# Patient Record
Sex: FEMALE | Race: BLACK OR AFRICAN AMERICAN | Employment: UNEMPLOYED | URBAN - METROPOLITAN AREA
[De-identification: names, ages, dates, MRNs, and addresses within clinical notes are randomized per-mention and may not be internally consistent; named-entity substitution may affect disease eponyms.]

---

## 2022-11-13 ENCOUNTER — HOSPITAL ENCOUNTER (EMERGENCY)
Age: 22
Discharge: HOME | End: 2022-11-13
Attending: EMERGENCY MEDICINE
Payer: MEDICAID

## 2022-11-13 ENCOUNTER — APPOINTMENT (OUTPATIENT)
Dept: CT IMAGING | Age: 22
End: 2022-11-13
Attending: EMERGENCY MEDICINE
Payer: MEDICAID

## 2022-11-13 VITALS
TEMPERATURE: 98.8 F | WEIGHT: 216 LBS | HEIGHT: 59 IN | HEART RATE: 93 BPM | SYSTOLIC BLOOD PRESSURE: 105 MMHG | BODY MASS INDEX: 43.55 KG/M2 | DIASTOLIC BLOOD PRESSURE: 65 MMHG | OXYGEN SATURATION: 100 % | RESPIRATION RATE: 20 BRPM

## 2022-11-13 DIAGNOSIS — E87.6 HYPOKALEMIA: ICD-10-CM

## 2022-11-13 DIAGNOSIS — R51.9 NONINTRACTABLE HEADACHE, UNSPECIFIED CHRONICITY PATTERN, UNSPECIFIED HEADACHE TYPE: Primary | ICD-10-CM

## 2022-11-13 DIAGNOSIS — R79.89 ELEVATED SERUM CREATININE: ICD-10-CM

## 2022-11-13 LAB
ANION GAP SERPL CALC-SCNC: 12 MMOL/L (ref 5–15)
BASOPHILS # BLD: 0 K/UL (ref 0–0.1)
BASOPHILS NFR BLD: 0 % (ref 0–1)
BUN SERPL-MCNC: 21 MG/DL (ref 6–20)
BUN/CREAT SERPL: 20 (ref 12–20)
CALCIUM SERPL-MCNC: 8.5 MG/DL (ref 8.5–10.1)
CHLORIDE SERPL-SCNC: 103 MMOL/L (ref 97–108)
CO2 SERPL-SCNC: 25 MMOL/L (ref 21–32)
CREAT SERPL-MCNC: 1.04 MG/DL (ref 0.55–1.02)
DIFFERENTIAL METHOD BLD: NORMAL
EOSINOPHIL # BLD: 0.1 K/UL (ref 0–0.4)
EOSINOPHIL NFR BLD: 1 % (ref 0–7)
ERYTHROCYTE [DISTWIDTH] IN BLOOD BY AUTOMATED COUNT: 13.5 % (ref 11.5–14.5)
FLUAV RNA SPEC QL NAA+PROBE: DETECTED
FLUBV RNA SPEC QL NAA+PROBE: NOT DETECTED
GLUCOSE SERPL-MCNC: 93 MG/DL (ref 65–100)
HCG SERPL QL: NEGATIVE
HCT VFR BLD AUTO: 40 % (ref 35–47)
HGB BLD-MCNC: 13.2 G/DL (ref 11.5–16)
IMM GRANULOCYTES # BLD AUTO: 0 K/UL (ref 0–0.04)
IMM GRANULOCYTES NFR BLD AUTO: 0 % (ref 0–0.5)
LYMPHOCYTES # BLD: 1.5 K/UL (ref 0.8–3.5)
LYMPHOCYTES NFR BLD: 19 % (ref 12–49)
MAGNESIUM SERPL-MCNC: 2 MG/DL (ref 1.6–2.4)
MCH RBC QN AUTO: 27.2 PG (ref 26–34)
MCHC RBC AUTO-ENTMCNC: 33 G/DL (ref 30–36.5)
MCV RBC AUTO: 82.3 FL (ref 80–99)
MONOCYTES # BLD: 0.9 K/UL (ref 0–1)
MONOCYTES NFR BLD: 11 % (ref 5–13)
NEUTS SEG # BLD: 5.2 K/UL (ref 1.8–8)
NEUTS SEG NFR BLD: 68 % (ref 32–75)
NRBC # BLD: 0 K/UL (ref 0–0.01)
NRBC BLD-RTO: 0 PER 100 WBC
PLATELET # BLD AUTO: 191 K/UL (ref 150–400)
PMV BLD AUTO: 11.2 FL (ref 8.9–12.9)
POTASSIUM SERPL-SCNC: 3.3 MMOL/L (ref 3.5–5.1)
RBC # BLD AUTO: 4.86 M/UL (ref 3.8–5.2)
SARS-COV-2, COV2: NOT DETECTED
SODIUM SERPL-SCNC: 140 MMOL/L (ref 136–145)
WBC # BLD AUTO: 7.7 K/UL (ref 3.6–11)

## 2022-11-13 PROCEDURE — 96374 THER/PROPH/DIAG INJ IV PUSH: CPT

## 2022-11-13 PROCEDURE — 96375 TX/PRO/DX INJ NEW DRUG ADDON: CPT

## 2022-11-13 PROCEDURE — 87636 SARSCOV2 & INF A&B AMP PRB: CPT

## 2022-11-13 PROCEDURE — 93005 ELECTROCARDIOGRAM TRACING: CPT

## 2022-11-13 PROCEDURE — 85025 COMPLETE CBC W/AUTO DIFF WBC: CPT

## 2022-11-13 PROCEDURE — 84703 CHORIONIC GONADOTROPIN ASSAY: CPT

## 2022-11-13 PROCEDURE — 70450 CT HEAD/BRAIN W/O DYE: CPT

## 2022-11-13 PROCEDURE — 80048 BASIC METABOLIC PNL TOTAL CA: CPT

## 2022-11-13 PROCEDURE — 74011250636 HC RX REV CODE- 250/636: Performed by: EMERGENCY MEDICINE

## 2022-11-13 PROCEDURE — 74011250637 HC RX REV CODE- 250/637: Performed by: EMERGENCY MEDICINE

## 2022-11-13 PROCEDURE — 99284 EMERGENCY DEPT VISIT MOD MDM: CPT

## 2022-11-13 PROCEDURE — 96361 HYDRATE IV INFUSION ADD-ON: CPT

## 2022-11-13 PROCEDURE — 83735 ASSAY OF MAGNESIUM: CPT

## 2022-11-13 RX ORDER — KETOROLAC TROMETHAMINE 30 MG/ML
30 INJECTION, SOLUTION INTRAMUSCULAR; INTRAVENOUS
Status: COMPLETED | OUTPATIENT
Start: 2022-11-13 | End: 2022-11-13

## 2022-11-13 RX ORDER — DIPHENHYDRAMINE HYDROCHLORIDE 50 MG/ML
50 INJECTION, SOLUTION INTRAMUSCULAR; INTRAVENOUS
Status: COMPLETED | OUTPATIENT
Start: 2022-11-13 | End: 2022-11-13

## 2022-11-13 RX ORDER — BUTALBITAL, ACETAMINOPHEN AND CAFFEINE 50; 325; 40 MG/1; MG/1; MG/1
1 TABLET ORAL
Qty: 20 TABLET | Refills: 0 | Status: SHIPPED | OUTPATIENT
Start: 2022-11-13

## 2022-11-13 RX ORDER — SODIUM CHLORIDE, SODIUM LACTATE, POTASSIUM CHLORIDE, CALCIUM CHLORIDE 600; 310; 30; 20 MG/100ML; MG/100ML; MG/100ML; MG/100ML
1000 INJECTION, SOLUTION INTRAVENOUS CONTINUOUS
Status: DISCONTINUED | OUTPATIENT
Start: 2022-11-13 | End: 2022-11-13 | Stop reason: HOSPADM

## 2022-11-13 RX ORDER — PROCHLORPERAZINE EDISYLATE 5 MG/ML
10 INJECTION INTRAMUSCULAR; INTRAVENOUS ONCE
Status: COMPLETED | OUTPATIENT
Start: 2022-11-13 | End: 2022-11-13

## 2022-11-13 RX ADMIN — POTASSIUM BICARBONATE 50 MEQ: 977.5 TABLET, EFFERVESCENT ORAL at 06:28

## 2022-11-13 RX ADMIN — KETOROLAC TROMETHAMINE 30 MG: 30 INJECTION, SOLUTION INTRAMUSCULAR; INTRAVENOUS at 04:02

## 2022-11-13 RX ADMIN — PROCHLORPERAZINE EDISYLATE 10 MG: 5 INJECTION INTRAMUSCULAR; INTRAVENOUS at 04:02

## 2022-11-13 RX ADMIN — DIPHENHYDRAMINE HYDROCHLORIDE 50 MG: 50 INJECTION, SOLUTION INTRAMUSCULAR; INTRAVENOUS at 04:02

## 2022-11-13 RX ADMIN — SODIUM CHLORIDE 1000 ML: 9 INJECTION, SOLUTION INTRAVENOUS at 04:02

## 2022-11-13 NOTE — DISCHARGE INSTRUCTIONS
[facility] SYSTEMS Departments     For adult and child immunizations, family planning, TB screening, STD testing and women's health services. [facility]: [place] [phone]      [facility] 25   [address]   [address]   170 [facility]: Walter Purvis [address] [facility] [address] [phone]      2400 [facility]          [address] [address]     For primary care services, woman and child wellness, and some clinics providing specialty care. [facility] -- [address] [facility]5 [facility] [phone]   411 [facility] 200 [facility] [address] [phone]   339 [facility] [address] [facility] [address] [phone]   [address] [facility] Page[employer] [address] [place] [address] 5850  Community  [phone]   [address] [place] [address] [phone]   [facility] 81 [facility] [phone]   [facility] 1051 [facility] [phone]   [facility]: [facility] cuauhtemoc Bynum [address] [place] [address], [address] [phone]     [facility] [address] [facility] [address] [phone]   [facility] 5850 [facility]  [phone]   [employer]  [address], [place] [address] ([email or web address]/[email or web address] asp) [phone]         Sexual Health/Woman Wellness Clinics    For STD/HIV testing and treatment, pregnancy testing and services, men's health, birth control services and hepatitis/HPV vaccine services. [facility] of [address] [facility] [phone]   [facility] [address] [phone]   Martha 17 600 [facility] [phone]   [address] [place] [address], 5th floor 3100 [facility] [address] [phone]   [facility] [employer] 201 [facility] [address] 7893 [facility] Blood [facility] 94 [facility]   [phone]   [place]   [phone]   Women, Infant and Children's Services: Cecily Dinero[address] [phone]   [address] [place] [address] Psychiatry     [phone]   [address] Crisis   [address] [phone]       Local [facility]  [facility]   [phone]    OSCAR Velázquez M.D. OSCAR Mcnally M.D.  MD Arabella Gaviria, ALICEP  Elizabeth Riddle, NAGA Riddle, RAYMOND Tejada, MD Jorge Kaiser, SONDRA [facility], [address]   [phone]     Pete Guerrero MD [address] [facility] [phone]  MD Nelson Britton MD [place] , MD Elizabeth Meyers MD        [phone]  Warden Vipin MD               2053 [facility] [phone]  MD Michael Medina MD Coit Sos, MD [facility] [phone]

## 2022-11-13 NOTE — ED PROVIDER NOTES
EMERGENCY DEPARTMENT HISTORY AND PHYSICAL EXAM      Date: 11/13/2022  Patient Name: Dhiraj Garibay    History of Presenting Illness     Chief Complaint   Patient presents with    Migraine    Vomiting       History Provided By: Patient    HPI: Dhiraj Garibay, 23 y.o. female with no pertinent past medical history presents emergency department chief complaint of headache, nausea vomiting. Patient reports intermittent bitemporal headache over the last week. Pain was worse yesterday evening with some associated nausea vomiting prompting him to come to the emergency department today. Patient reports history of similar headaches in the past.  Headache is gradual in onset. No visual changes, neck stiffness/pain or focal neurologic deficits. Denies photophobia. Pt denies any other alleviating or exacerbating factors. Additionally, pt specifically denies any recent fever, chills,  , abdominal pain, CP, SOB, lightheadedness, dizziness, numbness, weakness, BLE swelling, heart palpitations, urinary sxs, diarrhea, constipation, melena, hematochezia, cough, or congestion. PCP: None    Current Outpatient Medications   Medication Sig Dispense Refill    butalbital-acetaminophen-caffeine (FIORICET, ESGIC) -40 mg per tablet Take 1 Tablet by mouth every six (6) hours as needed for Headache. Indications: a migraine headache 20 Tablet 0       Past History     Past Medical History:  History reviewed, no pertinent past medical history    Past Surgical History:  No past surgical history on file. Family History:  No family history on file. Social History:  Denies current heavy alcohol or illicit drug use. Allergies:  No Known Allergies      Review of Systems   Review of Systems  Constitutional: Negative for fever, chills, and fatigue. HENT: Negative for congestion, sore throat, rhinorrhea, sneezing and neck stiffness   Eyes: Negative for discharge and redness.    Respiratory: Negative for  shortness of breath, wheezing Cardiovascular: Negative for chest pain, palpitations   Gastrointestinal: Positive for nausea, vomiting. Negative abdominal pain, constipation, diarrhea and blood in stool. Genitourinary: Negative for dysuria, hematuria, flank pain, decreased urine volume, discharge,   Musculoskeletal: Negative for myalgias or joint pain . Skin: Negative for rash or lesions . Neurological: Negative weakness, light-headedness, numbness. Positive headaches. Physical Exam   Physical Exam    GENERAL: alert and oriented, no acute distress  EYES: PEERL, No injection, discharge or icterus. ENT: Mucous membranes pink and moist.  NECK: Supple  LUNGS: Airway patent. Non-labored respirations. Breath sounds clear with good air entry bilaterally. HEART: Regular rate and rhythm. No peripheral edema  ABDOMEN: Non-distended and non-tender, without guarding or rebound. SKIN:  warm, dry  MSK/EXTREMITIES: Without swelling, tenderness or deformity, symmetric with normal ROM  NEUROLOGICAL:  alert and oriented x 3, CN II-XII grossly intact, strength 5/5 bilateral upper and lower extremities, sensation intact throughout to light touch, no dysmetria or ataxia noted        Diagnostic Study Results     Labs -   No results found for this or any previous visit (from the past 12 hour(s)). Radiologic Studies -   CT HEAD WO CONT   Final Result      No acute process. CT Results  (Last 48 hours)                 11/13/22 0612  CT HEAD WO CONT Final result    Impression:      No acute process. Narrative:  INDICATION: headache       EXAM:  HEAD CT WITHOUT CONTRAST       COMPARISON: None       TECHNIQUE:  Routine noncontrast axial head CT was performed. Sagittal and   coronal reconstructions were generated. CT dose reduction was achieved through use of a standardized protocol tailored   for this examination and automatic exposure control for dose modulation. FINDINGS:       Ventricles: Midline, no hydrocephalus. Intracranial Hemorrhage: None. Brain Parenchyma/Brainstem: Normal for age. Basal Cisterns: Normal.   Paranasal Sinuses: Visualized sinuses are clear. Additional Comments: N/A. CXR Results  (Last 48 hours)      None              Medical Decision Making     Emani HERNANDEZ MD am the first provider for this patient and am the attending of record for this patient encounter. I reviewed the vital signs, available nursing notes, past medical history, past surgical history, family history and social history. Vital Signs-Reviewed the patient's vital signs. No data found. Records Reviewed: Nursing Notes and Old Medical Records    Provider Notes (Medical Decision Making): On presentation, the patient is well appearing, in no acute distress with normal vital signs. Based on my history and exam the differential diagnosis for this patient includes migraine headache, subarachnoid hemorrhage, increased intercranial pressure, dural venous thrombosis, meningitis. Patient not experiencing any infectious symptoms to suggest meningitis. She has no visual changes or other symptoms that would suggest increased intercranial pressure, no focal neurodeficits. Patient does report a history of similar headaches however has not had neuroimaging done so obtained a CT which showed no acute findings. Otherwise her description would not be suggestive of subarachnoid and she would be negative per [place] subarachnoid rule so do not feel that CTA or lumbar puncture would be indicated. She was given migraine cocktail with complete resolution in symptoms and felt stable for discharge. Patient's labs are notable for slightly elevated serum creatinine, will need follow-up as an outpatient for further assessment. ED Course:   Initial assessment performed. The patients presenting problems have been discussed, and they are in agreement with the care plan formulated and outlined with them.   I have encouraged them to ask questions as they arise throughout their visit. Medications   sodium chloride 0.9 % bolus infusion 1,000 mL (0 mL IntraVENous IV Completed 11/13/22 0502)   prochlorperazine (COMPAZINE) injection 10 mg (10 mg IntraVENous Given 11/13/22 0402)   diphenhydrAMINE (BENADRYL) injection 50 mg (50 mg IntraVENous Given 11/13/22 0402)   ketorolac (TORADOL) injection 30 mg (30 mg IntraVENous Given 11/13/22 0402)   potassium bicarb-citric acid (EFFER-K) tablet 50 mEq (50 mEq Oral Given 11/13/22 0628)         PROGRESS  Johanne Kohler's  results have been reviewed with her. She has been counseled regarding her diagnosis. She verbally conveys understanding and agreement of the signs, symptoms, diagnosis, treatment and prognosis and additionally agrees to follow up as recommended. She also agrees with the care-plan and conveys that all of her questions have been answered. I have also put together some discharge instructions for her that include: 1) educational information regarding their diagnosis, 2) how to care for their diagnosis at home, as well a 3) list of reasons why they would want to return to the ED prior to their follow-up appointment, should their condition change. Disposition:  home    PLAN:  1. Discharge Medication List as of 11/13/2022  6:50 AM        START taking these medications    Details   butalbital-acetaminophen-caffeine (FIORICET, ESGIC) -40 mg per tablet Take 1 Tablet by mouth every six (6) hours as needed for Headache. Indications: a migraine headache, Normal, Disp-20 Tablet, R-0           2.    Follow-up Information       Follow up With Specialties Details Why [address] EMERGENCY DEPT Emergency Medicine  If symptoms worsen Stevie 27    1200 [facility] Internal Medicine Schedule an appointment as soon as possible for a visit   Ra Nicholson  [address] [place] [address]  [phone] Return to ED if worse     Diagnosis     Clinical Impression:   1. Nonintractable headache, unspecified chronicity pattern, unspecified headache type    2. Hypokalemia    3. Elevated serum creatinine        Please note that this dictation was completed with Dragon, computer voice recognition software. Quite often unanticipated grammatical, syntax, homophones, and other interpretive errors are inadvertently transcribed by the computer software. Please disregard these errors. Additionally, please excuse any errors that have escaped final proofreading.

## 2022-11-13 NOTE — ED NOTES
Emergency Department Nursing Plan of Care       The Nursing Plan of Care is developed from the Nursing assessment and Emergency Department Attending provider initial evaluation. The plan of care may be reviewed in the ED Provider note.     The Plan of Care was developed with the following considerations:   Patient / Family readiness to learn indicated by:verbalized understanding  Persons(s) to be included in education: patient  Barriers to Learning/Limitations:No    Signed     Suzanne Grossman RN    11/13/2022   3:28 AM

## 2022-11-14 LAB
ATRIAL RATE: 108 BPM
CALCULATED P AXIS, ECG09: 43 DEGREES
CALCULATED R AXIS, ECG10: 26 DEGREES
CALCULATED T AXIS, ECG11: 21 DEGREES
DIAGNOSIS, 93000: NORMAL
P-R INTERVAL, ECG05: 136 MS
Q-T INTERVAL, ECG07: 342 MS
QRS DURATION, ECG06: 74 MS
QTC CALCULATION (BEZET), ECG08: 458 MS
VENTRICULAR RATE, ECG03: 108 BPM

## 2024-02-17 ENCOUNTER — HOSPITAL ENCOUNTER (EMERGENCY)
Facility: HOSPITAL | Age: 24
Discharge: HOME | End: 2024-02-17
Payer: COMMERCIAL

## 2024-02-17 VITALS
BODY MASS INDEX: 44.76 KG/M2 | TEMPERATURE: 98.6 F | SYSTOLIC BLOOD PRESSURE: 125 MMHG | WEIGHT: 222 LBS | HEART RATE: 95 BPM | OXYGEN SATURATION: 96 % | RESPIRATION RATE: 20 BRPM | HEIGHT: 59 IN | DIASTOLIC BLOOD PRESSURE: 84 MMHG

## 2024-02-17 DIAGNOSIS — R11.10 POST-TUSSIVE EMESIS: ICD-10-CM

## 2024-02-17 DIAGNOSIS — J06.9 ACUTE UPPER RESPIRATORY INFECTION: Primary | ICD-10-CM

## 2024-02-17 LAB
FLUAV AG NPH QL IA: NEGATIVE
FLUBV AG NOSE QL IA: NEGATIVE

## 2024-02-17 PROCEDURE — 87635 SARS-COV-2 COVID-19 AMP PRB: CPT

## 2024-02-17 PROCEDURE — 99283 EMERGENCY DEPT VISIT LOW MDM: CPT

## 2024-02-17 PROCEDURE — 87804 INFLUENZA ASSAY W/OPTIC: CPT

## 2024-02-17 PROCEDURE — 6370000000 HC RX 637 (ALT 250 FOR IP): Performed by: PHYSICIAN ASSISTANT

## 2024-02-17 RX ORDER — BENZONATATE 100 MG/1
100 CAPSULE ORAL
Status: COMPLETED | OUTPATIENT
Start: 2024-02-17 | End: 2024-02-17

## 2024-02-17 RX ORDER — BENZONATATE 200 MG/1
200 CAPSULE ORAL 3 TIMES DAILY PRN
Qty: 21 CAPSULE | Refills: 0 | Status: SHIPPED | OUTPATIENT
Start: 2024-02-17 | End: 2024-02-24

## 2024-02-17 RX ADMIN — BENZONATATE 100 MG: 100 CAPSULE ORAL at 09:49

## 2024-02-17 ASSESSMENT — PAIN - FUNCTIONAL ASSESSMENT: PAIN_FUNCTIONAL_ASSESSMENT: NONE - DENIES PAIN

## 2024-02-17 NOTE — ED PROVIDER NOTES
[facility] EMERGENCY DEPT  EMERGENCY DEPARTMENT ENCOUNTER         Pt Name: Eva Liz  MRN: [id number]  Birthdate 2000  Date of evaluation: 2/17/2024  Provider: Evy Ga PA-C   PCP: No primary care provider on file.  Note Started: 9:45 AM EST on 2/17/24     CHIEF COMPLAINT       Chief Complaint   Patient presents with    Cough        HISTORY OF PRESENT ILLNESS: 1 or more elements      History From: Patient  None     Eva Liz is a 23 y.o. female with medical history significant for asthma who presents via self with complaints of  acute moderate persistent cough X 1 day.  States that she has had mild congestion as well as fevers for the last 3 days.  Over-the-counter cough medicine with minimal relief.  Last dose this morning.  No chest pain, shortness of breath, wheezing, hemoptysis, lightheadedness, dizziness, syncope, seizure, palpitations, orthopnea, PND, dyspnea on exertion, abdominal pain.  She does endorse posttussive emesis today, but denies any nausea.  No hematemesis.      Nursing Notes were all reviewed and agreed with or any disagreements were addressed in the HPI.     REVIEW OF SYSTEMS      Review of Systems   Constitutional:  Positive for chills and fever. Negative for activity change, appetite change, diaphoresis, fatigue and unexpected weight change.   HENT:  Positive for congestion. Negative for dental problem, drooling, ear discharge, ear pain, facial swelling, hearing loss, mouth sores, nosebleeds, postnasal drip, rhinorrhea, sinus pressure, sinus pain, sneezing, sore throat, tinnitus, trouble swallowing and voice change.    Eyes:  Negative for pain and visual disturbance.   Respiratory:  Positive for cough. Negative for chest tightness, shortness of breath, wheezing and stridor.    Cardiovascular:  Negative for chest pain, palpitations and leg swelling.   Gastrointestinal:  Positive for vomiting. Negative for abdominal pain, blood in stool, constipation, diarrhea and nausea.   Musculoskeletal:   history of asthma presents with acute moderate persistent cough X 1 day.  States that she has had mild congestion as well as fevers for the last 3 days.  Over-the-counter cough medicine with minimal relief.  Last dose this morning.  No chest pain, shortness of breath, wheezing, hemoptysis, lightheadedness, dizziness, syncope, seizure, palpitations, orthopnea, PND, dyspnea on exertion, abdominal pain.  She does endorse posttussive emesis today, but denies any nausea.  No hematemesis.  Patient is sitting upright and speaking in clear complete sentences with no cough noted on exam.  Normal heart lung sounds with no adventitious lung sounds.  Normal oropharynx.  Suspect viral syndrome.  Differential includes COVID, flu, other viral URI, bronchitis.  No adventitious lung sounds or other systemic symptoms concerning for pneumonia.  Will obtain COVID and flu screen and treat with antitussives.         Symptomatic therapy suggested: acetaminophen, ibuprofen, cough suppressant of choice. Increase fluids, use vaporizer, stay in steamy bathroom tid 15 min prn severe cough, tylenol as needed, rest, avoid smoky areas. Lack of antibiotic effectiveness discussed with her. Symptomatic therapy suggested: gargle for sore throat, use mist at bedside for congestion.  Apply facial warm packs for sinus pain or use nasal saline sprays. Follow up prn if not better in 72 hours.      Disposition Considerations (Tests not done, Shared Decision Making, Pt Expectation of Test or Tx.):        Progress Note:   Updated pt on all returned results and findings. Discussed the importance of proper follow up as referred below along with return precautions. Pt in agreement with the care plan and expresses agreement with and understanding of all items discussed.    FINAL IMPRESSION     1. Acute upper respiratory infection    2. Post-tussive emesis          DISPOSITION/PLAN   Eva Liz's  results have been reviewed with her.  She has been counseled

## 2024-02-18 LAB
SARS-COV-2 RNA RESP QL NAA+PROBE: NOT DETECTED
SOURCE: NORMAL

## 2024-11-26 SDOH — HEALTH STABILITY: PHYSICAL HEALTH: ON AVERAGE, HOW MANY DAYS PER WEEK DO YOU ENGAGE IN MODERATE TO STRENUOUS EXERCISE (LIKE A BRISK WALK)?: 2 DAYS

## 2024-11-26 SDOH — HEALTH STABILITY: PHYSICAL HEALTH: ON AVERAGE, HOW MANY MINUTES DO YOU ENGAGE IN EXERCISE AT THIS LEVEL?: 30 MIN

## 2024-11-27 ENCOUNTER — OFFICE VISIT (OUTPATIENT)
Facility: CLINIC | Age: 24
End: 2024-11-27
Payer: COMMERCIAL

## 2024-11-27 VITALS
RESPIRATION RATE: 16 BRPM | WEIGHT: 238 LBS | DIASTOLIC BLOOD PRESSURE: 64 MMHG | HEIGHT: 59 IN | SYSTOLIC BLOOD PRESSURE: 94 MMHG | BODY MASS INDEX: 47.98 KG/M2 | HEART RATE: 86 BPM | OXYGEN SATURATION: 97 %

## 2024-11-27 DIAGNOSIS — Z76.89 ENCOUNTER TO ESTABLISH CARE: Primary | ICD-10-CM

## 2024-11-27 DIAGNOSIS — Z11.59 NEED FOR HEPATITIS C SCREENING TEST: ICD-10-CM

## 2024-11-27 DIAGNOSIS — E66.01 OBESITY, MORBID (MORE THAN 100 LBS OVER IDEAL WEIGHT OR BMI > 40): ICD-10-CM

## 2024-11-27 DIAGNOSIS — Z11.8 SCREENING FOR CHLAMYDIAL DISEASE: ICD-10-CM

## 2024-11-27 DIAGNOSIS — E55.9 VITAMIN D DEFICIENCY: ICD-10-CM

## 2024-11-27 DIAGNOSIS — Z11.4 ENCOUNTER FOR SCREENING FOR HIV: ICD-10-CM

## 2024-11-27 DIAGNOSIS — R73.03 PREDIABETES: ICD-10-CM

## 2024-11-27 PROBLEM — Z13.21 ENCOUNTER FOR VITAMIN DEFICIENCY SCREENING: Status: ACTIVE | Noted: 2024-11-27

## 2024-11-27 LAB
APPEARANCE UR: CLEAR
BACTERIA URNS QL MICRO: NEGATIVE /HPF
BILIRUB UR QL: NEGATIVE
COLOR UR: ABNORMAL
EPITH CASTS URNS QL MICRO: ABNORMAL /LPF
GLUCOSE UR STRIP.AUTO-MCNC: NEGATIVE MG/DL
HGB UR QL STRIP: ABNORMAL
HYALINE CASTS URNS QL MICRO: ABNORMAL /LPF (ref 0–5)
KETONES UR QL STRIP.AUTO: NEGATIVE MG/DL
LEUKOCYTE ESTERASE UR QL STRIP.AUTO: NEGATIVE
NITRITE UR QL STRIP.AUTO: NEGATIVE
PH UR STRIP: 6 (ref 5–8)
PROT UR STRIP-MCNC: NEGATIVE MG/DL
RBC #/AREA URNS HPF: ABNORMAL /HPF (ref 0–5)
SP GR UR REFRACTOMETRY: 1.02 (ref 1–1.03)
UROBILINOGEN UR QL STRIP.AUTO: 0.2 EU/DL (ref 0.2–1)
WBC URNS QL MICRO: ABNORMAL /HPF (ref 0–4)

## 2024-11-27 PROCEDURE — 99204 OFFICE O/P NEW MOD 45 MIN: CPT

## 2024-11-27 SDOH — ECONOMIC STABILITY: FOOD INSECURITY: WITHIN THE PAST 12 MONTHS, YOU WORRIED THAT YOUR FOOD WOULD RUN OUT BEFORE YOU GOT MONEY TO BUY MORE.: NEVER TRUE

## 2024-11-27 SDOH — ECONOMIC STABILITY: FOOD INSECURITY: WITHIN THE PAST 12 MONTHS, THE FOOD YOU BOUGHT JUST DIDN'T LAST AND YOU DIDN'T HAVE MONEY TO GET MORE.: NEVER TRUE

## 2024-11-27 SDOH — ECONOMIC STABILITY: INCOME INSECURITY: HOW HARD IS IT FOR YOU TO PAY FOR THE VERY BASICS LIKE FOOD, HOUSING, MEDICAL CARE, AND HEATING?: NOT HARD AT ALL

## 2024-11-27 ASSESSMENT — PATIENT HEALTH QUESTIONNAIRE - PHQ9
SUM OF ALL RESPONSES TO PHQ QUESTIONS 1-9: 0
2. FEELING DOWN, DEPRESSED OR HOPELESS: NOT AT ALL
SUM OF ALL RESPONSES TO PHQ QUESTIONS 1-9: 0
1. LITTLE INTEREST OR PLEASURE IN DOING THINGS: NOT AT ALL
SUM OF ALL RESPONSES TO PHQ9 QUESTIONS 1 & 2: 0
SUM OF ALL RESPONSES TO PHQ QUESTIONS 1-9: 0
SUM OF ALL RESPONSES TO PHQ QUESTIONS 1-9: 0

## 2024-11-27 NOTE — PATIENT INSTRUCTIONS
WEIGHT LOSS PLAN    1.  Read food labels and count calories.  Goal 9145-3266 calories daily for women and 3756-7971 calories daily for men as you are weaning off sugar and preparing to start a Low Calorie Dietary Approach.  Achieve this by decreasing caloric intake by 500 calories by weekly increments.  NOTE:  1 pound of fat = 3500 kilocalories!    Www.loseit.Gojimo  Www.Codotapal.Gojimo  Www.Wimdu  Www.Goodoc.gov  Weight watchers mobile    Calories needed to lose 1-2 pounds a week = 10 x your weight in pounds    2.  Increase water intake.    Per Biggest Loser Weight loss Program, it is important to drink 1/2 your body weight in ounces of water daily.  Decrease your water consumption 2-3 hours before bedtime to prevent sleep disturbance from frequent urination.    3.  Decrease sugary beverages.  Each can or glass of soda increases your risk of obesity by 60%.  You can lose 10 pounds in a month by avoiding any soda.     12 oz can of soda = 140 calories, 16 oz cup of sweet tea = 200 calories    16 oz orange juice = 200 calories, 10 oz apple juice = 150 calories   32 oz sports drink = 200 calories, 16 oz punch = 240 calories   3.5 oz alcohol = 100-150 calories     4.  Avoid High Fructose Corn Syrup products.  This ingredient makes products highly addictive!    5.  Exercise 30 minutes daily 5 days weekly, minimally.  If you burn 3500 calories that equals a pound!  Try using a pedometer  or a smart watch to your count steps. Visit www.Heverest.ru.Gojimo for a free pedometer and diet recommendations.      Your maximum heart rate = 220 - your age    Never exercise at your maximum heart rate.    See handout for target heart rate.    6.  Decrease carbohydrates (white bread, pasta, rice, potatoes, sweet foods and sweet drinks like soda, tea, coffee, juice and sports drinks).  Increase fiber and protein.    Goal:   calories daily of carbohydrates     Try CHROMIUM PICONATE 200 MG THREE TIMES DAILY,    to

## 2024-11-28 LAB
25(OH)D3 SERPL-MCNC: 23.8 NG/ML (ref 30–100)
ALBUMIN SERPL-MCNC: 3.9 G/DL (ref 3.5–5)
ALBUMIN/GLOB SERPL: 1.1 (ref 1.1–2.2)
ALP SERPL-CCNC: 76 U/L (ref 45–117)
ALT SERPL-CCNC: 28 U/L (ref 12–78)
ANION GAP SERPL CALC-SCNC: 7 MMOL/L (ref 2–12)
AST SERPL-CCNC: 42 U/L (ref 15–37)
BILIRUB SERPL-MCNC: 0.4 MG/DL (ref 0.2–1)
BUN SERPL-MCNC: 18 MG/DL (ref 6–20)
BUN/CREAT SERPL: 26 (ref 12–20)
CALCIUM SERPL-MCNC: 9.4 MG/DL (ref 8.5–10.1)
CHLORIDE SERPL-SCNC: 108 MMOL/L (ref 97–108)
CHOLEST SERPL-MCNC: 154 MG/DL
CO2 SERPL-SCNC: 25 MMOL/L (ref 21–32)
CREAT SERPL-MCNC: 0.68 MG/DL (ref 0.55–1.02)
ERYTHROCYTE [DISTWIDTH] IN BLOOD BY AUTOMATED COUNT: 14.2 % (ref 11.5–14.5)
EST. AVERAGE GLUCOSE BLD GHB EST-MCNC: 126 MG/DL
GLOBULIN SER CALC-MCNC: 3.5 G/DL (ref 2–4)
GLUCOSE SERPL-MCNC: 76 MG/DL (ref 65–100)
HBA1C MFR BLD: 6 % (ref 4–5.6)
HCT VFR BLD AUTO: 39.9 % (ref 35–47)
HCV AB SER IA-ACNC: 0.15 INDEX
HCV AB SERPL QL IA: NONREACTIVE
HDLC SERPL-MCNC: 48 MG/DL
HDLC SERPL: 3.2 (ref 0–5)
HGB BLD-MCNC: 12.9 G/DL (ref 11.5–16)
HIV 1+2 AB+HIV1 P24 AG SERPL QL IA: NONREACTIVE
HIV 1/2 RESULT COMMENT: NORMAL
LDLC SERPL CALC-MCNC: 95.2 MG/DL (ref 0–100)
MCH RBC QN AUTO: 26.7 PG (ref 26–34)
MCHC RBC AUTO-ENTMCNC: 32.3 G/DL (ref 30–36.5)
MCV RBC AUTO: 82.4 FL (ref 80–99)
NRBC # BLD: 0 K/UL (ref 0–0.01)
NRBC BLD-RTO: 0 PER 100 WBC
PLATELET # BLD AUTO: 261 K/UL (ref 150–400)
PMV BLD AUTO: 10.7 FL (ref 8.9–12.9)
POTASSIUM SERPL-SCNC: 4.5 MMOL/L (ref 3.5–5.1)
PROT SERPL-MCNC: 7.4 G/DL (ref 6.4–8.2)
RBC # BLD AUTO: 4.84 M/UL (ref 3.8–5.2)
SODIUM SERPL-SCNC: 140 MMOL/L (ref 136–145)
TRIGL SERPL-MCNC: 54 MG/DL
VLDLC SERPL CALC-MCNC: 10.8 MG/DL
WBC # BLD AUTO: 7.1 K/UL (ref 3.6–11)

## 2024-12-02 LAB
C TRACH RRNA SPEC QL NAA+PROBE: NEGATIVE
N GONORRHOEA RRNA SPEC QL NAA+PROBE: NEGATIVE
SPECIMEN SOURCE: NORMAL
T VAGINALIS RRNA SPEC QL NAA+PROBE: NEGATIVE
TSH SERPL DL<=0.05 MIU/L-ACNC: 2.24 UIU/ML (ref 0.45–4.5)

## 2024-12-02 RX ORDER — ERGOCALCIFEROL 1.25 MG/1
50000 CAPSULE, LIQUID FILLED ORAL WEEKLY
Qty: 12 CAPSULE | Refills: 0 | Status: SHIPPED | OUTPATIENT
Start: 2024-12-02

## 2024-12-02 NOTE — PROGRESS NOTES
Chief Complaint   Patient presents with    Establish Care     1. Have you been to the ER, urgent care clinic since your last visit?  Hospitalized since your last visit?No    2. Have you seen or consulted any other health care providers outside of the [facility] since your last visit?  Include any pap smears or colon screening. No    
next office visit.  Use how you feel and  how your clothes fit as measurements of success.    12.  Address your spirituality to draw strength from above during your journey.  Remember \"I am fearfully and wonderfully made.  Marvelous in His eyes.\"    13.  Set realistic, appropriate and achievable weight loss goals:     RECOMMENDED TARGET WEIGHT LOSS:  Initial weight loss of 5-10% of your initial body weight achieved over 6 months or a decrease of 2 BMI units.     MINIMUM GOAL OF WEIGHT LOSS:  Reduce body weight and maintain a lower body weight.  Prevent weight gain.    RELATED WEBSITES:      Www.obesityaction.org  Consider joining the Obesity Action Coalition (OAC) for $25/year.  The OAC mission is to elevated and empower those affected by obesity through education, advocacy and support.  Quarterly journals included in membership fee.    Www.Zoomph  www.Skytap     RELATED DIETS:  Dr. Karthik Bass [place] Weight loss challenge, Mediterranean diet, [place] Beach Diet, Weight Watchers, Paleo Diet (anti-inflammatory diet)      Follow-up and Dispositions    Return in about 2 weeks (around 12/11/2024) for OV:Weight Mgmt, OV:LAB REV, OR SOONER IF NEEDED.       I have reviewed with the patient details of the assessment and plan and all questions were answered. Relevent patient education was performed.The most recent lab findings were reviewed with the patient.    An After Visit Summary was printed and given to the patient.      Signed By: HAVEN Sawyer NP     November 27, 2024         -----------------------------------------------------------------------------------------------------------------------------------------      The patient (or guardian, if applicable) and other individuals in attendance with the patient were advised that Artificial Intelligence will be utilized during this visit to record and process the conversation to generate a clinical note. The patient (or guardian, if

## 2024-12-20 ENCOUNTER — OFFICE VISIT (OUTPATIENT)
Facility: CLINIC | Age: 24
End: 2024-12-20
Payer: COMMERCIAL

## 2024-12-20 VITALS
WEIGHT: 238 LBS | BODY MASS INDEX: 47.98 KG/M2 | RESPIRATION RATE: 16 BRPM | OXYGEN SATURATION: 97 % | HEART RATE: 75 BPM | HEIGHT: 59 IN | DIASTOLIC BLOOD PRESSURE: 71 MMHG | SYSTOLIC BLOOD PRESSURE: 107 MMHG

## 2024-12-20 DIAGNOSIS — E66.01 OBESITY, MORBID (MORE THAN 100 LBS OVER IDEAL WEIGHT OR BMI > 40): Primary | ICD-10-CM

## 2024-12-20 DIAGNOSIS — R73.03 PREDIABETES: ICD-10-CM

## 2024-12-20 DIAGNOSIS — E55.9 VITAMIN D DEFICIENCY: ICD-10-CM

## 2024-12-20 DIAGNOSIS — E66.01 OBESITY, CLASS III, BMI 40-49.9 (MORBID OBESITY): ICD-10-CM

## 2024-12-20 PROCEDURE — 99214 OFFICE O/P EST MOD 30 MIN: CPT

## 2024-12-20 RX ORDER — TIRZEPATIDE 5 MG/.5ML
5 INJECTION, SOLUTION SUBCUTANEOUS WEEKLY
Qty: 2 ML | Refills: 0 | Status: SHIPPED | OUTPATIENT
Start: 2024-12-20 | End: 2025-01-09

## 2024-12-20 RX ORDER — TIRZEPATIDE 2.5 MG/.5ML
2.5 INJECTION, SOLUTION SUBCUTANEOUS
Qty: 2 ML | Refills: 0 | Status: SHIPPED | OUTPATIENT
Start: 2024-12-20 | End: 2025-01-09

## 2024-12-20 ASSESSMENT — PATIENT HEALTH QUESTIONNAIRE - PHQ9
1. LITTLE INTEREST OR PLEASURE IN DOING THINGS: NOT AT ALL
SUM OF ALL RESPONSES TO PHQ QUESTIONS 1-9: 0
SUM OF ALL RESPONSES TO PHQ9 QUESTIONS 1 & 2: 0
2. FEELING DOWN, DEPRESSED OR HOPELESS: NOT AT ALL

## 2024-12-20 NOTE — PROGRESS NOTES
Eva Liz is a 24 y.o. female , established patient, here for evaluation of the following chief complaint(s): Discuss Labs and Weight Management     Subjective:  History of Present Illness  The patient is here for a follow-up visit and to discuss lab results.    She has been diagnosed with prediabetes. She has not yet started a gym regimen but has incorporated walking into her routine. She has contacted her insurance provider regarding coverage for weight loss medications and was informed that Zepbound is covered. She is currently making efforts to reduce her dietary intake. She reports no chest pain, shortness of breath, diarrhea, constipation, or discharges.    She is also has vitamin D deficiency.    FAMILY HISTORY  She does not have any family history of thyroid medullary cancer or thyroid tumors.    Wt Readings from Last 3 Encounters:   12/20/24 108 kg (238 lb)   11/27/24 108 kg (238 lb)   02/17/24 100.7 kg (222 lb)     Reviewed PmHx, RxHx, FmHx, SocHx, AllgHx and updated in chart.     Review of Systems  Constitutional: negative for fevers, chills, anorexia and weight loss  Eyes:   negative for visual disturbance and irritation  ENT:   negative for tinnitus,sore throat,nasal congestion,ear pains.hoarseness  Respiratory:  negative for cough, hemoptysis, dyspnea,wheezing  CV:   negative for chest pain, palpitations, lower extremity edema  GI:   negative for nausea, vomiting, diarrhea, abdominal pain,melena  Endo:               negative for polyuria,polydipsia,polyphagia,heat intolerance  Genitourinary: negative for frequency, dysuria and hematuria  Integument:  negative for rash and pruritus  Hematologic:  negative for easy bruising and gum/nose bleeding  Musculoskel: negative for myalgias, arthralgias, back pain, muscle weakness, joint pain  Neurological:  negative for headaches, dizziness, vertigo, memory problems and gait   Behavl/Psych: negative for feelings of anxiety, depression, mood changes    Past

## 2024-12-20 NOTE — PATIENT INSTRUCTIONS
WEIGHT LOSS PLAN    1.  Read food labels and count calories.  Goal 6917-5683 calories daily for women and 1711-0420 calories daily for men as you are weaning off sugar and preparing to start a Low Calorie Dietary Approach.  Achieve this by decreasing caloric intake by 500 calories by weekly increments.  NOTE:  1 pound of fat = 3500 kilocalories!    [email or web address]  [email or web address]  [email or web address]  [email or web address].[employer]  Weight watchers mobile    Calories needed to lose 1-2 pounds a week = 10 x your weight in pounds    2.  Increase water intake.    Per [employer] [facility], it is important to drink 1/2 your body weight in ounces of water daily.  Decrease your water consumption 2-3 hours before bedtime to prevent sleep disturbance from frequent urination.    3.  Decrease sugary beverages.  Each can or glass of soda increases your risk of obesity by 60%.  You can lose 10 pounds in a month by avoiding any soda.     12 oz can of soda = 140 calories, 16 oz cup of sweet tea = 200 calories    16 oz orange juice = 200 calories, 10 oz apple juice = 150 calories   32 oz sports drink = 200 calories, 16 oz punch = 240 calories   3.5 oz alcohol = 100-150 calories     4.  Avoid High Fructose Corn Syrup products.  This ingredient makes products highly addictive!    5.  Exercise 30 minutes daily 5 days weekly, minimally.  If you burn 3500 calories that equals a pound!  Try using a pedometer  or a smart watch to your count steps. Visit www.[employer] for a free pedometer and diet recommendations.      Your maximum heart rate = 220 - your age    Never exercise at your maximum heart rate.    See handout for target heart rate.    6.  Decrease carbohydrates (white bread, pasta, rice, potatoes, sweet foods and sweet drinks like soda, tea, coffee, juice and sports drinks).  Increase fiber and protein.    Goal:   calories daily of carbohydrates     Try CHROMIUM PICONATE 200 MG THREE TIMES DAILY,    to

## 2024-12-20 NOTE — PROGRESS NOTES
Chief Complaint   Patient presents with    Discuss Labs    Weight Management     1. Have you been to the ER, urgent care clinic since your last visit?  Hospitalized since your last visit?No    2. Have you seen or consulted any other health care providers outside [facility] since your last visit?  Include any pap smears or colon screening. No

## 2024-12-27 PROBLEM — Z13.21 ENCOUNTER FOR VITAMIN DEFICIENCY SCREENING: Status: RESOLVED | Noted: 2024-11-27 | Resolved: 2024-12-27

## 2025-01-09 DIAGNOSIS — E66.01 OBESITY, CLASS III, BMI 40-49.9 (MORBID OBESITY): ICD-10-CM

## 2025-01-09 DIAGNOSIS — R73.03 PREDIABETES: ICD-10-CM

## 2025-01-09 DIAGNOSIS — E66.01 OBESITY, MORBID (MORE THAN 100 LBS OVER IDEAL WEIGHT OR BMI > 40): Primary | ICD-10-CM

## 2025-01-09 RX ORDER — SEMAGLUTIDE 0.5 MG/.5ML
0.5 INJECTION, SOLUTION SUBCUTANEOUS
Qty: 2 ML | Refills: 0 | Status: SHIPPED | OUTPATIENT
Start: 2025-02-08

## 2025-01-09 RX ORDER — SEMAGLUTIDE 0.25 MG/.5ML
0.25 INJECTION, SOLUTION SUBCUTANEOUS
Qty: 2 ML | Refills: 0 | Status: SHIPPED | OUTPATIENT
Start: 2025-01-09 | End: 2025-02-08

## 2025-01-09 RX ORDER — ERGOCALCIFEROL 1.25 MG/1
50000 CAPSULE, LIQUID FILLED ORAL WEEKLY
Qty: 12 CAPSULE | Refills: 0 | Status: SHIPPED | OUTPATIENT
Start: 2025-01-09